# Patient Record
Sex: FEMALE | Race: WHITE | NOT HISPANIC OR LATINO | Employment: OTHER | URBAN - NONMETROPOLITAN AREA
[De-identification: names, ages, dates, MRNs, and addresses within clinical notes are randomized per-mention and may not be internally consistent; named-entity substitution may affect disease eponyms.]

---

## 2017-02-02 ENCOUNTER — TRANSCRIBE ORDERS (OUTPATIENT)
Dept: ADMINISTRATIVE | Facility: HOSPITAL | Age: 57
End: 2017-02-02

## 2017-02-02 DIAGNOSIS — Z12.31 VISIT FOR SCREENING MAMMOGRAM: Primary | ICD-10-CM

## 2017-07-11 ENCOUNTER — HOSPITAL ENCOUNTER (OUTPATIENT)
Dept: MAMMOGRAPHY | Facility: HOSPITAL | Age: 57
Discharge: HOME | End: 2017-07-11
Admitting: NURSE PRACTITIONER

## 2017-07-11 DIAGNOSIS — Z12.31 VISIT FOR SCREENING MAMMOGRAM: ICD-10-CM

## 2017-07-11 PROCEDURE — G0202 SCR MAMMO BI INCL CAD: HCPCS

## 2017-07-11 PROCEDURE — 77063 BREAST TOMOSYNTHESIS BI: CPT

## 2018-08-10 ENCOUNTER — TRANSCRIBE ORDERS (OUTPATIENT)
Dept: ADMINISTRATIVE | Facility: HOSPITAL | Age: 58
End: 2018-08-10

## 2018-08-10 DIAGNOSIS — Z12.39 BREAST CANCER SCREENING: Primary | ICD-10-CM

## 2018-09-21 ENCOUNTER — HOSPITAL ENCOUNTER (OUTPATIENT)
Dept: MAMMOGRAPHY | Facility: HOSPITAL | Age: 58
Discharge: HOME | End: 2018-09-21
Admitting: NURSE PRACTITIONER

## 2018-09-21 DIAGNOSIS — Z12.39 BREAST CANCER SCREENING: ICD-10-CM

## 2018-09-21 PROCEDURE — 77063 BREAST TOMOSYNTHESIS BI: CPT

## 2018-09-21 PROCEDURE — 77067 SCR MAMMO BI INCL CAD: CPT

## 2019-01-25 ENCOUNTER — TRANSCRIBE ORDERS (OUTPATIENT)
Dept: ADMINISTRATIVE | Facility: HOSPITAL | Age: 59
End: 2019-01-25

## 2019-01-25 DIAGNOSIS — Z13.820 OSTEOPOROSIS SCREENING: Primary | ICD-10-CM

## 2019-02-01 ENCOUNTER — HOSPITAL ENCOUNTER (OUTPATIENT)
Dept: BONE DENSITY | Facility: HOSPITAL | Age: 59
Discharge: HOME | End: 2019-02-01
Admitting: PHYSICIAN ASSISTANT

## 2019-02-01 DIAGNOSIS — Z13.820 OSTEOPOROSIS SCREENING: ICD-10-CM

## 2019-02-01 PROCEDURE — 77080 DXA BONE DENSITY AXIAL: CPT

## 2019-05-29 ENCOUNTER — TELEPHONE (OUTPATIENT)
Dept: GASTROENTEROLOGY | Facility: CLINIC | Age: 59
End: 2019-05-29

## 2019-05-29 NOTE — TELEPHONE ENCOUNTER
I have sent 2 letters to pt re: recall colon and rec'd no response. I tried to call today to discuss with pt-was unable to reach them so I will send strike 3 letter to pt and noncompliant letter to PCP.

## 2019-06-19 ENCOUNTER — TRANSCRIBE ORDERS (OUTPATIENT)
Dept: ADMINISTRATIVE | Facility: HOSPITAL | Age: 59
End: 2019-06-19

## 2019-06-19 DIAGNOSIS — Z12.39 BREAST CANCER SCREENING: Primary | ICD-10-CM

## 2019-09-23 ENCOUNTER — HOSPITAL ENCOUNTER (OUTPATIENT)
Dept: MAMMOGRAPHY | Facility: HOSPITAL | Age: 59
Discharge: HOME | End: 2019-09-23
Admitting: PHYSICIAN ASSISTANT

## 2019-09-23 DIAGNOSIS — Z12.39 BREAST CANCER SCREENING: ICD-10-CM

## 2019-09-23 PROCEDURE — 77067 SCR MAMMO BI INCL CAD: CPT

## 2019-09-23 PROCEDURE — 77063 BREAST TOMOSYNTHESIS BI: CPT

## 2020-11-05 ENCOUNTER — TRANSCRIBE ORDERS (OUTPATIENT)
Dept: ADMINISTRATIVE | Facility: HOSPITAL | Age: 60
End: 2020-11-05

## 2020-11-05 DIAGNOSIS — Z12.31 OTHER SCREENING MAMMOGRAM: Primary | ICD-10-CM

## 2020-11-17 ENCOUNTER — HOSPITAL ENCOUNTER (OUTPATIENT)
Dept: MAMMOGRAPHY | Facility: HOSPITAL | Age: 60
Discharge: HOME | End: 2020-11-17
Admitting: PHYSICIAN ASSISTANT

## 2020-11-17 DIAGNOSIS — Z12.31 OTHER SCREENING MAMMOGRAM: ICD-10-CM

## 2020-11-17 PROCEDURE — 77063 BREAST TOMOSYNTHESIS BI: CPT

## 2020-11-17 PROCEDURE — 77067 SCR MAMMO BI INCL CAD: CPT

## 2022-01-12 ENCOUNTER — TRANSCRIBE ORDERS (OUTPATIENT)
Dept: ADMINISTRATIVE | Facility: HOSPITAL | Age: 62
End: 2022-01-12

## 2022-01-12 DIAGNOSIS — Z12.31 ENCOUNTER FOR SCREENING MAMMOGRAM FOR MALIGNANT NEOPLASM OF BREAST: Primary | ICD-10-CM

## 2022-03-07 ENCOUNTER — HOSPITAL ENCOUNTER (OUTPATIENT)
Dept: MAMMOGRAPHY | Facility: HOSPITAL | Age: 62
Discharge: HOME | End: 2022-03-07
Admitting: FAMILY MEDICINE

## 2022-03-07 DIAGNOSIS — Z12.31 ENCOUNTER FOR SCREENING MAMMOGRAM FOR MALIGNANT NEOPLASM OF BREAST: ICD-10-CM

## 2022-03-07 PROCEDURE — 77063 BREAST TOMOSYNTHESIS BI: CPT

## 2022-03-07 PROCEDURE — 77067 SCR MAMMO BI INCL CAD: CPT

## 2022-09-07 ENCOUNTER — TRANSCRIBE ORDERS (OUTPATIENT)
Dept: ADMINISTRATIVE | Facility: HOSPITAL | Age: 62
End: 2022-09-07

## 2022-09-07 DIAGNOSIS — Z12.31 ENCOUNTER FOR SCREENING MAMMOGRAM FOR MALIGNANT NEOPLASM OF BREAST: ICD-10-CM

## 2022-09-07 DIAGNOSIS — M81.0 SENILE OSTEOPOROSIS: Primary | ICD-10-CM

## 2022-09-20 ENCOUNTER — HOSPITAL ENCOUNTER (OUTPATIENT)
Dept: BONE DENSITY | Facility: HOSPITAL | Age: 62
Discharge: HOME | End: 2022-09-20
Admitting: PHYSICIAN ASSISTANT

## 2022-09-20 DIAGNOSIS — M81.0 SENILE OSTEOPOROSIS: ICD-10-CM

## 2022-09-20 PROCEDURE — 77080 DXA BONE DENSITY AXIAL: CPT

## 2023-03-20 ENCOUNTER — HOSPITAL ENCOUNTER (OUTPATIENT)
Dept: MAMMOGRAPHY | Facility: HOSPITAL | Age: 63
Discharge: HOME | End: 2023-03-20
Admitting: PHYSICIAN ASSISTANT
Payer: COMMERCIAL

## 2023-03-20 DIAGNOSIS — Z12.31 ENCOUNTER FOR SCREENING MAMMOGRAM FOR MALIGNANT NEOPLASM OF BREAST: ICD-10-CM

## 2023-03-20 PROCEDURE — 77063 BREAST TOMOSYNTHESIS BI: CPT

## 2023-03-20 PROCEDURE — 77067 SCR MAMMO BI INCL CAD: CPT

## 2023-12-13 ENCOUNTER — HOSPITAL ENCOUNTER (OUTPATIENT)
Dept: SLEEP CENTER | Age: 63
Discharge: HOME | End: 2023-12-15

## 2023-12-13 NOTE — PROGRESS NOTES
Jazzy Busy presented today in the sleep center for a Home Sleep Test (HST). Ms. Wesley Villegas was instructed on the device and was requested to wear the unit for two nights.  was asked to mail the HST monitor back on  12/15/2023. The patient acknowledged she understood.       Return Sipping # [phone]

## 2023-12-28 ENCOUNTER — TELEPHONE (OUTPATIENT)
Dept: SLEEP CENTER | Age: 63
End: 2023-12-28

## 2023-12-29 NOTE — TELEPHONE ENCOUNTER
Left voicemail with Mr. Sandhu Alba regarding her HST performed 12/13/2023 and 12/14/2023. The HSTs revealed an AHI within normal limits.    was advised to follow-up with his/her ordering provider, Tri Alcazar  and discuss the recommendations made by the interpreting physician

## 2024-03-06 ENCOUNTER — OFFICE VISIT (OUTPATIENT)
Dept: PULMONOLOGY | Age: 64
End: 2024-03-06
Payer: COMMERCIAL

## 2024-03-06 VITALS
BODY MASS INDEX: 37.17 KG/M2 | WEIGHT: 202 LBS | HEART RATE: 87 BPM | HEIGHT: 62 IN | DIASTOLIC BLOOD PRESSURE: 80 MMHG | OXYGEN SATURATION: 98 % | SYSTOLIC BLOOD PRESSURE: 137 MMHG | TEMPERATURE: 98 F

## 2024-03-06 DIAGNOSIS — G47.33 OSA (OBSTRUCTIVE SLEEP APNEA): ICD-10-CM

## 2024-03-06 DIAGNOSIS — G47.8 NON-RESTORATIVE SLEEP: ICD-10-CM

## 2024-03-06 DIAGNOSIS — G47.10 HYPERSOMNIA: Primary | ICD-10-CM

## 2024-03-06 DIAGNOSIS — Z72.820 POOR SLEEP: ICD-10-CM

## 2024-03-06 DIAGNOSIS — F41.9 ANXIETY: ICD-10-CM

## 2024-03-06 DIAGNOSIS — R06.83 SNORING: ICD-10-CM

## 2024-03-06 DIAGNOSIS — R00.0 FAST HEART BEAT: ICD-10-CM

## 2024-03-06 PROCEDURE — 99204 OFFICE O/P NEW MOD 45 MIN: CPT | Performed by: INTERNAL MEDICINE

## 2024-03-06 RX ORDER — METOPROLOL SUCCINATE 50 MG
1 TABLET, EXTENDED RELEASE 24 HR ORAL DAILY
COMMUNITY

## 2024-03-06 RX ORDER — PANTOPRAZOLE SODIUM 40 MG/1
40 TABLET, DELAYED RELEASE ORAL DAILY
COMMUNITY
Start: 2021-11-10

## 2024-03-06 RX ORDER — LIOTHYRONINE SODIUM 5 UG/1
TABLET ORAL
COMMUNITY
Start: 2021-11-18

## 2024-03-06 RX ORDER — RABEPRAZOLE SODIUM 20 MG/1
1 TABLET, DELAYED RELEASE ORAL DAILY
COMMUNITY

## 2024-03-06 RX ORDER — ESCITALOPRAM OXALATE 10 MG/1
1 TABLET ORAL
COMMUNITY
Start: 2024-03-06 | End: 2024-04-05

## 2024-03-06 RX ORDER — LEVOTHYROXINE SODIUM 0.03 MG/1
1 TABLET ORAL EVERY MORNING
COMMUNITY
Start: 2021-08-17

## 2024-03-06 RX ORDER — LOSARTAN POTASSIUM AND HYDROCHLOROTHIAZIDE 12.5; 1 MG/1; MG/1
1 TABLET ORAL
COMMUNITY
Start: 2023-11-29

## 2024-03-06 RX ORDER — UBIDECARENONE 200 MG
CAPSULE ORAL
COMMUNITY
Start: 2021-09-20

## 2024-03-06 ASSESSMENT — ENCOUNTER SYMPTOMS
BACK PAIN: 0
APNEA: 0
ABDOMINAL DISTENTION: 0
CHEST TIGHTNESS: 0
WHEEZING: 0
ANAL BLEEDING: 0
SHORTNESS OF BREATH: 0
COUGH: 0
ABDOMINAL PAIN: 0
RHINORRHEA: 0

## 2024-03-06 NOTE — PROGRESS NOTES
breathing.  I was asked to see her regarding the above.  Denies smoking.  She endorses palpitations in her chest.  Her palpitations are occasionally triggered by changing position in bed.  She endorses history of \"fast heartbeat.\"        Prior to Visit Medications    Medication Sig Taking? Authorizing Provider   TOPROL XL 50 MG extended release tablet Take 1 tablet by mouth daily Yes Mansoor Kaplan MD   pantoprazole (PROTONIX) 40 MG tablet Take 1 tablet by mouth daily Yes Mansoor Kaplan MD   losartan-hydroCHLOROthiazide (HYZAAR) 100-12.5 MG per tablet Take 1 tablet by mouth Every Day Yes Mansoor Kaplan MD   escitalopram (LEXAPRO) 10 MG tablet Take 1 tablet by mouth Every Day Yes Mansoor Kaplan MD   coenzyme Q10 200 MG CAPS capsule Take by mouth Yes Mansoor Kaplan MD   levothyroxine (SYNTHROID) 25 MCG tablet Take 1 tablet by mouth every morning Yes Mansoor Kaplan MD   RABEprazole (ACIPHEX) 20 MG tablet Take 1 tablet by mouth daily Yes Mansoor Kaplan MD   liothyronine (CYTOMEL) 5 MCG tablet TAKE ONE TABLET BY MOUTH EVERY DAY ON AN EMPTY STOMACH Yes Mansoor Kaplan MD        Review of Systems   Constitutional:  Negative for activity change, appetite change, chills, diaphoresis and fatigue.   HENT:  Negative for congestion, dental problem, drooling, ear discharge, postnasal drip and rhinorrhea.    Eyes:  Negative for visual disturbance.   Respiratory:  Negative for apnea, cough, chest tightness, shortness of breath and wheezing.    Gastrointestinal:  Negative for abdominal distention, abdominal pain and anal bleeding.   Endocrine: Negative for cold intolerance, heat intolerance and polydipsia.   Genitourinary:  Negative for difficulty urinating, dysuria, enuresis and flank pain.   Musculoskeletal:  Negative for arthralgias, back pain and gait problem.   Allergic/Immunologic: Negative for environmental allergies.   Neurological:  Negative for dizziness, facial

## 2024-10-24 ENCOUNTER — TRANSCRIBE ORDERS (OUTPATIENT)
Dept: ADMINISTRATIVE | Facility: HOSPITAL | Age: 64
End: 2024-10-24
Payer: COMMERCIAL

## 2024-10-24 DIAGNOSIS — Z12.31 ENCOUNTER FOR SCREENING MAMMOGRAM FOR MALIGNANT NEOPLASM OF BREAST: Primary | ICD-10-CM

## 2024-11-05 ENCOUNTER — HOSPITAL ENCOUNTER (OUTPATIENT)
Dept: MAMMOGRAPHY | Facility: HOSPITAL | Age: 64
Discharge: HOME | End: 2024-11-05
Admitting: FAMILY MEDICINE
Payer: MEDICARE

## 2024-11-05 DIAGNOSIS — Z12.31 ENCOUNTER FOR SCREENING MAMMOGRAM FOR MALIGNANT NEOPLASM OF BREAST: ICD-10-CM

## 2024-11-05 PROCEDURE — 77063 BREAST TOMOSYNTHESIS BI: CPT

## 2024-11-05 PROCEDURE — 77067 SCR MAMMO BI INCL CAD: CPT

## 2025-01-28 ENCOUNTER — OFFICE VISIT (OUTPATIENT)
Dept: OTOLARYNGOLOGY | Facility: CLINIC | Age: 65
End: 2025-01-28
Payer: MEDICARE

## 2025-01-28 VITALS
TEMPERATURE: 97.7 F | SYSTOLIC BLOOD PRESSURE: 117 MMHG | RESPIRATION RATE: 20 BRPM | WEIGHT: 195.2 LBS | HEART RATE: 70 BPM | HEIGHT: 62 IN | BODY MASS INDEX: 35.92 KG/M2 | DIASTOLIC BLOOD PRESSURE: 70 MMHG

## 2025-01-28 DIAGNOSIS — E04.1 THYROID NODULE: Primary | ICD-10-CM

## 2025-01-28 RX ORDER — PANTOPRAZOLE SODIUM 40 MG/1
1 TABLET, DELAYED RELEASE ORAL DAILY
COMMUNITY
Start: 2024-12-16

## 2025-01-28 RX ORDER — CHOLECALCIFEROL (VITAMIN D3) 25 MCG
1000 TABLET ORAL DAILY
COMMUNITY

## 2025-01-28 RX ORDER — METOPROLOL SUCCINATE 50 MG
1 TABLET, EXTENDED RELEASE 24 HR ORAL DAILY
COMMUNITY

## 2025-01-28 RX ORDER — LIOTHYRONINE SODIUM 5 UG/1
5 TABLET ORAL DAILY
COMMUNITY

## 2025-01-28 RX ORDER — MAGNESIUM OXIDE 400 MG/1
400 TABLET ORAL DAILY
COMMUNITY

## 2025-01-28 RX ORDER — LEVOTHYROXINE SODIUM 25 UG/1
25 TABLET ORAL
COMMUNITY

## 2025-01-28 RX ORDER — LOSARTAN POTASSIUM AND HYDROCHLOROTHIAZIDE 12.5; 5 MG/1; MG/1
1 TABLET ORAL DAILY
COMMUNITY

## 2025-01-28 NOTE — PROGRESS NOTES
ALMA Esparza  [facility]  [address], [address]  [place] [address]  Fax [phone]  Phone [phone]      Visit Type: NEW PATIENT   Chief Complaint   Patient presents with    [facility] Care     NEW PATIENT- THYROID NODULE NEW NOD ON THE RIGHT SIDE, LEFT SIDE HAS BEEN REMOVED.           HPI      History of Present Illness  The patient is a new patient who was previously under the care of Dr. Jesus Gutiérrez. She had a left thyroidectomy and an isthmusectomy in 08/2015. The patient presents today with right-sided nodules.    She underwent a left thyroidectomy in 2015 due to the presence of nodules, which were not malignant. Despite normal blood levels, she experienced persistent symptoms, leading her to seek a second opinion. An ultrasound revealed two nodules measuring 1.9, prompting a biopsy and subsequent removal. She reports no exposure to radiation beyond standard x-rays. She does not experience any compression symptoms or difficulty swallowing, except when consuming apples with the peel. Her symptoms are well-managed as long as her GERD remains under control.    Supplemental Information  She has been under significant stress over the past six months, caring for her mother who has Parkinson's disease and assisting her sister-in-law with mental health issues.    FAMILY HISTORY  Her maternal grandmother had thyroid cancer. Her aunt had thyroid issues. Her mother was diagnosed with Parkinson's disease.    Results  Imaging  Ultrasound shows a 1.8 x 1.9 cm hypoechoic nodule in the right lobe and a cyst in the right lobe.    History reviewed. No pertinent past medical history.    Past Surgical History:   Procedure Laterality Date    BREAST BIOPSY Left 12/2003    benign    HYSTERECTOMY      OOPHORECTOMY         Family History: Her family history includes Breast cancer in her paternal aunt and paternal grandmother.     Social  History: She  has no history on file for tobacco use, alcohol use, and drug use.    Home Medications:  Coenzyme Q10-Red Yeast Rice, Potassium, cholecalciferol, levothyroxine, liothyronine, losartan-hydrochlorothiazide, magnesium oxide, metoprolol succinate XL, and pantoprazole    Allergies:  She has No Known Allergies.       Vital Signs:   Temp:  [97.7 °F (36.5 °C)] 97.7 °F (36.5 °C)  Heart Rate:  [70] 70  Resp:  [20] 20  BP: (117)/(70) 117/70  ENT Physical Exam  Neck  Neck: neck normal; neck palpation normal;  Thyroid: nodules present on right lobe; left lobe absent;       Physical Exam          Result Review       RESULTS REVIEW    I have reviewed the patients old records in the chart.   The following results/records were reviewed:   IMAGING SCANNED (11/05/2024) 1.8x1.9 right lobe, bx recommended       Assessment & Plan  Thyroid nodule       Assessment & Plan  1. Right-sided thyroid nodule.  The patient presents with a 1.8 x 1.9 cm hypoechoic nodule in the right lobe of the thyroid, which has shown significant growth from its previous measurement of 1.0 x 1.0 cm. A needle aspiration biopsy is recommended to evaluate the nodule further. An order for the biopsy will be placed today, and she will be contacted by central scheduling to set up the procedure. She will follow up with Dr. Gutiérrez after the biopsy to discuss the results and determine the next steps, which may include monitoring or surgical removal of the remaining thyroid tissue.    Follow-up  The patient will follow up on 03/04/2025 at 10:30 AM to discuss the biopsy results and the subsequent treatment plan.    PROCEDURE  The patient underwent a left thyroidectomy and an isthmusectomy in 08/2015.     Orders Placed This Encounter   Procedures    US Guided Thyroid Biopsy         Medical and surgical options were discussed including observation and fine needle aspiration. Risks, benefits and alternatives were discussed and questions were answered. After  considering the options, the patient decided to proceed with fine needle aspiration.  Return in about 6 weeks (around 3/11/2025) for Follow up with Dr. Gutiérrez.        Electronically signed by ALMA Esparza 01/28/25 11:36 AM CST.     Patient or patient representative verbalized consent for the use of Ambient Listening during the visit with  ALMA Esparza for chart documentation. 1/28/2025  11:36 CST

## 2025-02-10 ENCOUNTER — TELEPHONE (OUTPATIENT)
Dept: INTERVENTIONAL RADIOLOGY/VASCULAR | Facility: HOSPITAL | Age: 65
End: 2025-02-10

## 2025-02-11 ENCOUNTER — HOSPITAL ENCOUNTER (OUTPATIENT)
Dept: ULTRASOUND IMAGING | Facility: HOSPITAL | Age: 65
Discharge: HOME | End: 2025-02-11
Payer: MEDICARE

## 2025-02-11 DIAGNOSIS — E04.1 THYROID NODULE: ICD-10-CM

## 2025-02-11 PROCEDURE — 76942 ECHO GUIDE FOR BIOPSY: CPT

## 2025-02-11 PROCEDURE — 76536 US EXAM OF HEAD AND NECK: CPT

## 2025-02-11 RX ORDER — LIDOCAINE HYDROCHLORIDE 10 MG/ML
5 INJECTION, SOLUTION INFILTRATION; PERINEURAL ONCE
Status: DISPENSED | OUTPATIENT
Start: 2025-02-11

## 2025-02-19 LAB
BEAKER LAB AP INTRAOPERATIVE CONSULTATION: NORMAL
CYTO UR: NORMAL
LAB AP CASE REPORT: NORMAL
LAB AP DIAGNOSIS COMMENT: NORMAL
Lab: NORMAL
PATH REPORT.FINAL DX SPEC: NORMAL
PATH REPORT.GROSS SPEC: NORMAL

## 2025-02-20 ENCOUNTER — TELEPHONE (OUTPATIENT)
Dept: INTERVENTIONAL RADIOLOGY/VASCULAR | Facility: HOSPITAL | Age: 65
End: 2025-02-20
Payer: MEDICARE

## 2025-02-20 NOTE — TELEPHONE ENCOUNTER
"I called and touched base with patient after biopsy. Patient had no complaints or complications after procedure. Patient stated that Dr. Sims and Varsha adams, explained things thoroughly. Patient also stated \"I had a very good experience.\"  "

## 2025-03-04 ENCOUNTER — OFFICE VISIT (OUTPATIENT)
Dept: OTOLARYNGOLOGY | Facility: CLINIC | Age: 65
End: 2025-03-04
Payer: MEDICARE

## 2025-03-04 VITALS
HEIGHT: 62 IN | WEIGHT: 194 LBS | BODY MASS INDEX: 35.7 KG/M2 | SYSTOLIC BLOOD PRESSURE: 144 MMHG | HEART RATE: 68 BPM | TEMPERATURE: 98.1 F | DIASTOLIC BLOOD PRESSURE: 82 MMHG

## 2025-03-04 DIAGNOSIS — E04.1 THYROID NODULE: Primary | ICD-10-CM

## 2025-03-04 DIAGNOSIS — E89.0 H/O PARTIAL THYROIDECTOMY: ICD-10-CM

## 2025-03-04 RX ORDER — INDOMETHACIN 50 MG/1
CAPSULE ORAL
COMMUNITY
Start: 2024-12-18

## 2025-03-04 NOTE — PROGRESS NOTES
YOB: 1960  Location: [facility]  Location Address: [address] [place] [address], [address] [facility] [address], [address] [place] [address]  Location Phone: [phone]    Chief Complaint   Patient presents with    Thyroid Problem     Discuss fna       History of Present Illness  Jordana Baptiste is a 64 y.o. female.  Jordana Baptiste is here for follow up of ENT complaints. The patient has had problems with thyroid nodules   She has a history of left thyroidectomy in  with benign pathology   Patient has had serial ultrasound since that time and has a right nodule that was shown to have had minimal change at last visit   She underwent fine needle aspiration and revealed benign pathology     She is taking levothyroxine 25 mcg and cytomel 5 mcg       Fine Needle Aspiration (2025 11:32) - reviewed and discussed    LABS SCANNED (2024) - reviewed and discussed      Past Medical History:   Diagnosis Date    Disease of thyroid gland     Nodules removed      Dizziness     GERD (gastroesophageal reflux disease) ?    Nosebleed 1969    Allergy nosebleeds since i was a child    Tinnitus        Past Surgical History:   Procedure Laterality Date    BREAST BIOPSY Left 2003    benign    HYSTERECTOMY      OOPHORECTOMY      THYROID SURGERY  2015    Left side removed       Outpatient Medications Marked as Taking for the 3/4/25 encounter (Office Visit) with Piotr Gutiérrez MD   Medication Sig Dispense Refill    Cholecalciferol 25 MCG (1000 UT) tablet Take 1 tablet by mouth Daily.      Coenzyme Q10-Red Yeast Rice  MG capsule Take 300 mg by mouth Daily.      indomethacin (INDOCIN) 50 MG capsule take one capsule by mouth twice daily with food      levothyroxine (SYNTHROID, LEVOTHROID) 25 MCG tablet Take 1 tablet by mouth Every Morning.      liothyronine (CYTOMEL) 5 MCG tablet Take 1 tablet by mouth Daily.      losartan-hydrochlorothiazide (HYZAAR) 50-12.5 MG per tablet Take 1 tablet by mouth Daily.      magnesium  oxide (MAG-OX) 400 MG tablet Take 1 tablet by mouth Daily.      pantoprazole (PROTONIX) 40 MG EC tablet Take 1 tablet by mouth Daily.      Potassium 99 MG tablet Take 99 mg by mouth Daily.      Toprol XL 50 MG 24 hr tablet Take 1 tablet by mouth Daily.       Current Facility-Administered Medications for the 3/4/25 encounter (Office Visit) with Piotr Gutiérrez MD   Medication Dose Route Frequency Provider Last Rate Last Admin    lidocaine (XYLOCAINE) 1 % injection 5 mL  5 mL Subcutaneous Once Lalo Sims MD           Escitalopram, Simvastatin, and Ropinirole    Family History   Problem Relation Age of Onset    Breast cancer Paternal Grandmother     Cancer Paternal Grandmother         Breast cancer    Breast cancer Paternal Aunt     Cancer Mother         Skin cancer nose    Hypertension Mother     Cancer Father         Colon cancer [id number]    Heart failure Father         Heart attack open heart surgery    Hypertension Father     Cancer Maternal Grandfather         Pancreas    Cancer Maternal Grandmother         Cancer on tongue. Alz    Thyroid disease Maternal Grandmother     Cancer Maternal Aunt         Brain and lung cancer 2023    Heart failure Brother         Heart attack    Hypertension Brother        Social History     Socioeconomic History    Marital status:    Tobacco Use    Smoking status: Never    Smokeless tobacco: Never   Vaping Use    Vaping status: Never Used   Substance and Sexual Activity    Alcohol use: Yes     Comment: Only occasionally one or two beer    Drug use: Never       Review of Systems   Constitutional: Negative.    HENT: Negative.         Vitals:    03/04/25 1059   BP: 144/82   Pulse: 68   Temp: 98.1 °F (36.7 °C)       Body mass index is 35.48 kg/m².    Objective     Physical Exam  Vitals reviewed.   Constitutional:       Appearance: She is obese.   HENT:      Head: Normocephalic.      Right Ear: Tympanic membrane, ear canal and external ear normal.      Left Ear:  Tympanic membrane, ear canal and external ear normal.      Nose: Nose normal.      Mouth/Throat:      Lips: Pink.      Mouth: Mucous membranes are moist.      Comments: Left tonsillar cyst     Neck:      Comments: Left surgically absent right thyroid nodule     Neurological:      Mental Status: She is alert.         Assessment & Plan   Diagnoses and all orders for this visit:    1. Thyroid nodule (Primary)  -     US Thyroid; Future    2. H/O partial thyroidectomy      * Surgery not found *  Orders Placed This Encounter   Procedures    US Thyroid     Standing Status:   Future     Standing Expiration Date:   3/4/2026     Order Specific Question:   Reason for Exam:     Answer:   Thyroid disease     Order Specific Question:   Release to patient     Answer:   Routine Release [[id number]]     Return in about 6 months (around 9/4/2025).     Repeat ultrasound in 6 months   Call for new/worsening concern     There are no Patient Instructions on file for this visit.

## 2025-07-17 ENCOUNTER — TELEPHONE (OUTPATIENT)
Dept: PHARMACY | Facility: CLINIC | Age: 65
End: 2025-07-17

## 2025-07-17 NOTE — TELEPHONE ENCOUNTER
[facility] CLINICAL PHARMACY: ADHERENCE REVIEW  Identified care gap per [employer]: fills at Non-preferred pharmacy: [facility]: ACE/ARB adherence    ASSESSMENT    ACE/ARB ADHERENCE    Insurance Records claims through 25 (Prior Year PDC = not reported; YTD PDC = 64%; Potential Fail Date: 25):   LOSARTAN/HCT -12.5 last filled on 25 for 90 day supply. Next refill due: 25    Prescribed si tablet/capsule daily    Per Insurer Portal: last filled on same    Per [facility]: will get  90 day supply ready to  since past due.    BP Readings from Last 3 Encounters:   24 137/80     CrCl cannot be calculated (No successful lab value found.).  No results found for: \"CREATININE\"  No results found for: \"K\"    The following are interventions that have been identified:   Patient OVERDUE refilling LOSARTAN/HCT -12.5 and active on home medication list.     Reached patient to review. Patient said she has a couple weeks left and takes it as she should and is not sure why we are calling her.     Verified medication instructions and Education provided.      Last Visit: none  Next Visit: none    Noelle Tapia CPhT  [facility]   [facility]  Toll Free: [phone] Option 1    For Pharmacy Admin Tracking Only    Program: Snapshot Interactive  CPA in place:  No  Recommendation Provided To: Patient/Caregiver: 1 via Telephone and Pharmacy: 1  Intervention Detail: Adherence Monitorin  Intervention Accepted By: Patient/Caregiver: 1 and Pharmacy: 1  Gap Closed?: Yes   Time Spent (min): 15

## 2025-08-20 ENCOUNTER — OFFICE VISIT (OUTPATIENT)
Dept: OTOLARYNGOLOGY | Facility: CLINIC | Age: 65
End: 2025-08-20
Payer: MEDICARE

## 2025-08-20 VITALS
WEIGHT: 194 LBS | DIASTOLIC BLOOD PRESSURE: 76 MMHG | HEIGHT: 62 IN | SYSTOLIC BLOOD PRESSURE: 134 MMHG | BODY MASS INDEX: 35.7 KG/M2

## 2025-08-20 DIAGNOSIS — E89.0 H/O PARTIAL THYROIDECTOMY: ICD-10-CM

## 2025-08-20 DIAGNOSIS — R53.83 FATIGUE, UNSPECIFIED TYPE: ICD-10-CM

## 2025-08-20 DIAGNOSIS — E04.1 THYROID NODULE: Primary | ICD-10-CM

## 2025-08-20 RX ORDER — LEVOTHYROXINE SODIUM 50 UG/1
50 TABLET ORAL
COMMUNITY
Start: 2025-07-14